# Patient Record
Sex: MALE | Race: WHITE | NOT HISPANIC OR LATINO
[De-identification: names, ages, dates, MRNs, and addresses within clinical notes are randomized per-mention and may not be internally consistent; named-entity substitution may affect disease eponyms.]

---

## 2022-12-16 ENCOUNTER — APPOINTMENT (OUTPATIENT)
Dept: ORTHOPEDIC SURGERY | Facility: CLINIC | Age: 67
End: 2022-12-16

## 2022-12-16 VITALS — HEIGHT: 70 IN | WEIGHT: 180 LBS | BODY MASS INDEX: 25.77 KG/M2

## 2022-12-16 DIAGNOSIS — Z00.00 ENCOUNTER FOR GENERAL ADULT MEDICAL EXAMINATION W/OUT ABNORMAL FINDINGS: ICD-10-CM

## 2022-12-16 DIAGNOSIS — Z78.9 OTHER SPECIFIED HEALTH STATUS: ICD-10-CM

## 2022-12-16 DIAGNOSIS — M70.52 OTHER BURSITIS OF KNEE, LEFT KNEE: ICD-10-CM

## 2022-12-16 DIAGNOSIS — I10 ESSENTIAL (PRIMARY) HYPERTENSION: ICD-10-CM

## 2022-12-16 PROCEDURE — 99203 OFFICE O/P NEW LOW 30 MIN: CPT

## 2022-12-16 PROCEDURE — 73564 X-RAY EXAM KNEE 4 OR MORE: CPT | Mod: LT

## 2022-12-16 NOTE — PHYSICAL EXAM
[5___] : quadriceps 5[unfilled]/5 [Negative] : negative Tashia's [Left] : left knee [All Views] : anteroposterior, lateral, skyline, and anteroposterior standing [Degenerative change] : Degenerative change [] : not mildly antalgic [TWNoteComboBox7] : flexion 130 degrees

## 2022-12-16 NOTE — HISTORY OF PRESENT ILLNESS
[Result of repetitive motion] : result of repetitive motion [0] : 0 [Radiating] : radiating [Shooting] : shooting [Tightness] : tightness [Intermittent] : intermittent [Household chores] : household chores [Leisure] : leisure [Full time] : Work status: full time [de-identified] : some swelling, no injury, tried OTCs without help, worse at end of day [] : no [FreeTextEntry1] : l knee [FreeTextEntry5] : pain for 6 weeks\par walking quite a bit/5 miles a day [FreeTextEntry6] : spasms; feels pressure [FreeTextEntry7] : downwar [FreeTextEntry9] : tried Ibuprofen/Aleve [de-identified] : worse at night, bending [de-identified] : [place] [[place]identified] : [place] [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

## 2022-12-16 NOTE — REVIEW OF SYSTEMS
[Joint Pain] : joint pain [Joint Swelling] : joint swelling [Negative] : Heme/Lymph [FreeTextEntry9] : clicking occasionally

## 2022-12-16 NOTE — DISCUSSION/SUMMARY
[de-identified] : Patient allowed to gently start resuming activities.\par Discussed change to medication prescription and usage. \par Offered cortisone steroid injection. \par Bracing options discussed with patient. \par Hyaluronic Acid inj pamphlet given to pt.\par 12/16/2022 \par \par  RE:  ABRIL RODRIGUEZ \par \par Acct #- [id number] \par \par \par Attention:  Nurse Reviewer /Medical Director\par \par I am writing this letter as a medical necessity for PT program.\par Patient has tried analgesics, non-steroid anti-inflammatory agents, \par hot or cold compresses,injections of corticosteroids, etc)  which in combination or by themselves has not worked.\par Based on my patient's condition, I strongly believe that the PT is medically needed.\par  \par Thank you for your time and consideration.   \par \par

## 2022-12-23 ENCOUNTER — APPOINTMENT (OUTPATIENT)
Dept: ORTHOPEDIC SURGERY | Facility: CLINIC | Age: 67
End: 2022-12-23

## 2022-12-23 VITALS — HEIGHT: 70 IN | WEIGHT: 180 LBS | BODY MASS INDEX: 25.77 KG/M2

## 2022-12-23 PROCEDURE — 20611 DRAIN/INJ JOINT/BURSA W/US: CPT | Mod: LT

## 2022-12-23 PROCEDURE — 99213 OFFICE O/P EST LOW 20 MIN: CPT | Mod: 25

## 2022-12-23 NOTE — REVIEW OF SYSTEMS
[Joint Pain] : joint pain [Joint Stiffness] : joint stiffness [Joint Swelling] : joint swelling [Negative] : Heme/Lymph [FreeTextEntry9] : clicking occasionally

## 2022-12-23 NOTE — DISCUSSION/SUMMARY
[de-identified] : Patient allowed to gently start resuming activities.\par Discussed change to medication prescription and usage. \par Offered cortisone steroid injection. \par Hyaluronic Acid inj pamphlet given to pt.\par

## 2022-12-23 NOTE — PHYSICAL EXAM
[5___] : quadriceps 5[unfilled]/5 [Negative] : negative Tashia's [] : not mildly antalgic [Left] : left knee [All Views] : anteroposterior, lateral, skyline, and anteroposterior standing [Degenerative change] : Degenerative change [TWNoteComboBox7] : flexion 130 degrees

## 2022-12-23 NOTE — HISTORY OF PRESENT ILLNESS
[7] : 7 [Radiating] : radiating [Shooting] : shooting [Tingling] : tingling [de-identified] : some swelling, no injury, tried OTCs without help, has known OA [Result of repetitive motion] : result of repetitive motion [0] : 0 [Tightness] : tightness [Intermittent] : intermittent [Household chores] : household chores [Leisure] : leisure [Full time] : Work status: full time [] : no [FreeTextEntry1] : l knee [FreeTextEntry5] : pain for 6 weeks\par walking quite a bit/5 miles a day [FreeTextEntry6] : spasms; feels pressure [FreeTextEntry7] : downwar [FreeTextEntry9] : tried Ibuprofen/Aleve [de-identified] : worse at night, bending [de-identified] : [place] [de[place]identified] : in [place] [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

## 2023-01-06 ENCOUNTER — APPOINTMENT (OUTPATIENT)
Dept: ORTHOPEDIC SURGERY | Facility: CLINIC | Age: 68
End: 2023-01-06
Payer: MEDICARE

## 2023-01-06 VITALS — BODY MASS INDEX: 25.77 KG/M2 | WEIGHT: 180 LBS | HEIGHT: 70 IN

## 2023-01-06 PROCEDURE — 20611 DRAIN/INJ JOINT/BURSA W/US: CPT

## 2023-01-06 PROCEDURE — 99024 POSTOP FOLLOW-UP VISIT: CPT

## 2023-01-06 NOTE — HISTORY OF PRESENT ILLNESS
[Localized] : localized [Orthovisc] : Orthovisc [1] : 2 [Physical therapy] : physical therapy [Injection therapy] : injection therapy [Walking] : walking [2] : 2 [de-identified] : some swelling, no injury, tried OTCs without help,, returns for Orthovisc #2  has known OA [] : no [FreeTextEntry1] : left knee [de-identified] : 12-23-22 [de-identified] : l knee

## 2023-01-06 NOTE — REASON FOR VISIT
Pt up to restroom at this time with decreased work of breathing and decreased cough. [Spouse] : spouse [FreeTextEntry2] : l knee pain

## 2023-01-13 ENCOUNTER — APPOINTMENT (OUTPATIENT)
Dept: ORTHOPEDIC SURGERY | Facility: CLINIC | Age: 68
End: 2023-01-13
Payer: MEDICARE

## 2023-01-13 VITALS — HEIGHT: 70 IN | BODY MASS INDEX: 25.77 KG/M2 | WEIGHT: 180 LBS

## 2023-01-13 PROCEDURE — 20611 DRAIN/INJ JOINT/BURSA W/US: CPT | Mod: LT

## 2023-01-13 NOTE — HISTORY OF PRESENT ILLNESS
[1] : 2 [Localized] : localized [Injection therapy] : injection therapy [3] : 3 [Orthovisc] : Orthovisc [de-identified] : returns for Orthovisc #3  [] : no [FreeTextEntry1] : left knee [de-identified] : 1-6-23 [de-identified] : left knee

## 2023-01-20 ENCOUNTER — APPOINTMENT (OUTPATIENT)
Dept: ORTHOPEDIC SURGERY | Facility: CLINIC | Age: 68
End: 2023-01-20
Payer: MEDICARE

## 2023-01-20 VITALS — WEIGHT: 180 LBS | HEIGHT: 70 IN | BODY MASS INDEX: 25.77 KG/M2

## 2023-01-20 DIAGNOSIS — M17.12 UNILATERAL PRIMARY OSTEOARTHRITIS, LEFT KNEE: ICD-10-CM

## 2023-01-20 PROCEDURE — 20611 DRAIN/INJ JOINT/BURSA W/US: CPT | Mod: LT

## 2023-01-20 PROCEDURE — 99024 POSTOP FOLLOW-UP VISIT: CPT

## 2023-01-20 NOTE — PHYSICAL EXAM
[Left] : left knee [5___] : quadriceps 5[unfilled]/5 [Negative] : negative Tashia's [] : not mildly antalgic [TWNoteComboBox7] : flexion 130 degrees

## 2023-01-20 NOTE — HISTORY OF PRESENT ILLNESS
[0] : 0 [Localized] : localized [Injection therapy] : injection therapy [4] : 4 [Orthovisc] : Orthovisc [de-identified] : returns for Orthovisc #4 left knee [] : no [FreeTextEntry1] : l knee [de-identified] : 1-13-23 [de-identified] : l knee

## 2023-07-15 ENCOUNTER — OFFICE (OUTPATIENT)
Dept: URBAN - METROPOLITAN AREA CLINIC 105 | Facility: CLINIC | Age: 68
Setting detail: OPHTHALMOLOGY
End: 2023-07-15
Payer: COMMERCIAL

## 2023-07-15 DIAGNOSIS — Z01.00: ICD-10-CM

## 2023-07-15 PROBLEM — H52.4 PRESBYOPIA: Status: ACTIVE | Noted: 2023-07-15

## 2023-07-15 PROBLEM — H25.13 CATARACT SENILE NUCLEAR SCLEROSIS; BOTH EYES: Status: ACTIVE | Noted: 2023-07-15

## 2023-07-15 PROBLEM — H52.03 HYPEROPIA; BOTH EYES: Status: ACTIVE | Noted: 2023-07-15

## 2023-07-15 PROCEDURE — 92004 COMPRE OPH EXAM NEW PT 1/>: CPT | Performed by: OPTOMETRIST

## 2023-07-15 ASSESSMENT — AXIALLENGTH_DERIVED
OD_AL: 23.3823
OD_AL: 23.287
OS_AL: 23.3318
OS_AL: 23.2843

## 2023-07-15 ASSESSMENT — REFRACTION_CURRENTRX
OD_SPHERE: +1.50
OD_VPRISM_DIRECTION: PROGS
OS_OVR_VA: 20/
OD_CYLINDER: -0.75
OS_CYLINDER: -1.25
OD_OVR_VA: 20/
OS_VPRISM_DIRECTION: PROGS
OS_ADD: +2.00
OS_AXIS: 077
OS_SPHERE: +2.25
OD_AXIS: 088
OD_ADD: +2.00

## 2023-07-15 ASSESSMENT — REFRACTION_MANIFEST
OD_SPHERE: +1.75
OD_AXIS: 095
OS_AXIS: 090
OD_VA1: 20/20
OS_VA1: 20/20
OD_CYLINDER: -1.00
OS_ADD: +2.50
OS_CYLINDER: -2.00
OS_SPHERE: +2.50
OD_ADD: +2.50

## 2023-07-15 ASSESSMENT — KERATOMETRY
OS_K2POWER_DIOPTERS: 43.50
OS_AXISANGLE_DEGREES: 003
OD_K1POWER_DIOPTERS: 42.50
OS_K1POWER_DIOPTERS: 41.75
OD_AXISANGLE_DEGREES: 004
OD_K2POWER_DIOPTERS: 43.00

## 2023-07-15 ASSESSMENT — SPHEQUIV_DERIVED
OD_SPHEQUIV: 1.25
OS_SPHEQUIV: 1.625
OS_SPHEQUIV: 1.5
OD_SPHEQUIV: 1.5

## 2023-07-15 ASSESSMENT — REFRACTION_AUTOREFRACTION
OD_CYLINDER: -1.50
OD_AXIS: 094
OS_AXIS: 090
OD_SPHERE: +2.25
OS_SPHERE: +2.75
OS_CYLINDER: -2.25

## 2023-07-15 ASSESSMENT — VISUAL ACUITY
OS_BCVA: 20/20
OD_BCVA: 20/25-2

## 2023-07-15 ASSESSMENT — CONFRONTATIONAL VISUAL FIELD TEST (CVF)
OS_FINDINGS: FULL
OD_FINDINGS: FULL

## 2023-07-15 ASSESSMENT — TONOMETRY
OD_IOP_MMHG: 18
OS_IOP_MMHG: 20

## 2024-09-12 ENCOUNTER — OFFICE (OUTPATIENT)
Dept: URBAN - METROPOLITAN AREA CLINIC 105 | Facility: CLINIC | Age: 69
Setting detail: OPHTHALMOLOGY
End: 2024-09-12
Payer: MEDICARE

## 2024-09-12 ENCOUNTER — RX ONLY (RX ONLY)
Age: 69
End: 2024-09-12

## 2024-09-12 DIAGNOSIS — H04.321: ICD-10-CM

## 2024-09-12 PROCEDURE — 99213 OFFICE O/P EST LOW 20 MIN: CPT | Performed by: REGISTERED NURSE

## 2024-09-12 ASSESSMENT — CONFRONTATIONAL VISUAL FIELD TEST (CVF)
OS_FINDINGS: FULL
OD_FINDINGS: FULL

## 2024-09-12 ASSESSMENT — LID EXAM ASSESSMENTS
OD_EDEMA: RLL RUL 2+
OD_ECCHYMOSIS: RLL RUL 2+